# Patient Record
Sex: FEMALE | Race: WHITE | NOT HISPANIC OR LATINO | URBAN - NONMETROPOLITAN AREA
[De-identification: names, ages, dates, MRNs, and addresses within clinical notes are randomized per-mention and may not be internally consistent; named-entity substitution may affect disease eponyms.]

---

## 2017-12-06 PROBLEM — H52.4: Noted: 2017-12-06

## 2017-12-06 PROBLEM — H52.03: Noted: 2017-12-06

## 2017-12-06 PROBLEM — H52.223: Noted: 2017-12-06

## 2020-01-09 ENCOUNTER — IMPORTED ENCOUNTER (OUTPATIENT)
Dept: URBAN - NONMETROPOLITAN AREA CLINIC 1 | Facility: CLINIC | Age: 60
End: 2020-01-09

## 2020-01-09 PROCEDURE — S0621 ROUTINE OPHTHALMOLOGICAL EXA: HCPCS

## 2020-01-09 PROCEDURE — 92310 CONTACT LENS FITTING OU: CPT

## 2020-01-09 NOTE — PATIENT DISCUSSION
Hyperopia-Discussed diagnosis with patient. Astigmatism-Discussed diagnosis with patient. Presbyopia-Discussed diagnosis with patient. Updated spec Rx given. Recommend lens that will provide comfort as well as protect safety and health of eyes. cl's trials givenair optix mf od +2.50/med  os+4.00/medtheses lenses acceptableorder av 1 day nidia matos and pt to  and try9/1/2020 Pt likes B&L Purevision 2 cl's  R +2.50/Low L +3.50/High.

## 2020-03-13 ENCOUNTER — IMPORTED ENCOUNTER (OUTPATIENT)
Dept: URBAN - NONMETROPOLITAN AREA CLINIC 1 | Facility: CLINIC | Age: 60
End: 2020-03-13

## 2020-03-13 PROCEDURE — 99213 OFFICE O/P EST LOW 20 MIN: CPT

## 2020-03-13 NOTE — PATIENT DISCUSSION
KURT OUEDCUATE PTSTART TOBRADEX 1GTT QID OU X 1WKSWITCH TO CLEAR CAREHyperopia-Discussed diagnosis with patient. Astigmatism-Discussed diagnosis with patient. Presbyopia-Discussed diagnosis with patient. Updated spec Rx given. Recommend lens that will provide comfort as well as protect safety and health of eyes. cl's trials givenair optix mf od +2.50/med  os+4.00/medtheses lenses acceptableorder av 1 day nidia matos and pt to  and try

## 2020-05-18 PROBLEM — H52.4: Noted: 2020-05-18

## 2020-05-18 PROBLEM — H52.03: Noted: 2020-05-18

## 2020-05-18 PROBLEM — H52.223: Noted: 2020-05-18

## 2020-05-18 PROBLEM — H10.413: Noted: 2020-05-18

## 2021-07-02 ENCOUNTER — IMPORTED ENCOUNTER (OUTPATIENT)
Dept: URBAN - NONMETROPOLITAN AREA CLINIC 1 | Facility: CLINIC | Age: 61
End: 2021-07-02

## 2021-07-02 PROBLEM — H25.813: Noted: 2021-07-02

## 2021-07-02 PROBLEM — H52.4: Noted: 2021-07-02

## 2021-07-02 PROBLEM — H02.413: Noted: 2021-07-02

## 2021-07-02 PROBLEM — H52.223: Noted: 2021-07-02

## 2021-07-02 PROBLEM — H02.403: Noted: 2021-07-02

## 2021-07-02 PROBLEM — H40.033: Noted: 2021-07-02

## 2021-07-02 PROCEDURE — 92310 CONTACT LENS FITTING OU: CPT

## 2021-07-02 PROCEDURE — 76514 ECHO EXAM OF EYE THICKNESS: CPT

## 2021-07-02 PROCEDURE — 92133 CPTRZD OPH DX IMG PST SGM ON: CPT

## 2021-07-02 PROCEDURE — 92015 DETERMINE REFRACTIVE STATE: CPT

## 2021-07-02 PROCEDURE — 92012 INTRM OPH EXAM EST PATIENT: CPT

## 2021-07-02 NOTE — PATIENT DISCUSSION
Hyperopia-Discussed diagnosis with patient. Astigmatism-Discussed diagnosis with patient. Presbyopia-Discussed diagnosis with patient. Updated spec Rx given. Recommend lens that will provide comfort as well as protect safety and health of eyes. Will call patient when new OASYS MULTIFOCAL fititng set is inCataract OU-Not yet surgical. -Reviewed symptoms of advancing cataract growth such as glare and halos and decreased vision.-Continue to monitor for now. Pt will notify us if any new symptoms develop. Ptosis-Discussed Upneeq; information given will call if interested. ANAG-Updated Erik Edwards 73 OCT and PACH today; s/p PPI OU

## 2021-07-19 ENCOUNTER — IMPORTED ENCOUNTER (OUTPATIENT)
Dept: URBAN - NONMETROPOLITAN AREA CLINIC 1 | Facility: CLINIC | Age: 61
End: 2021-07-19

## 2021-07-19 NOTE — PATIENT DISCUSSION
Hyperopia-Discussed diagnosis with patient. Astigmatism-Discussed diagnosis with patient. Presbyopia-Discussed diagnosis with patient. Updated spec Rx given. Recommend lens that will provide comfort as well as protect safety and health of eyes. Will call patient when new OASYS MULTIFOCAL fititng set is inCataract OU-Not yet surgical. -Reviewed symptoms of advancing cataract growth such as glare and halos and decreased vision.-Continue to monitor for now. Pt will notify us if any new symptoms develop. Ptosis-Discussed Upneeq; information given will call if interested. ANAG-Updated Erik Edwards 73 OCT and PACH today; s/p PPI OU7/19/21- RX Check New Glasses rx dispensed today per SS.  Discussed CTL's rx with pt. and doing well with AV Oasys-will be contacted for CTL's fitting for Oasys MF.

## 2021-08-02 ENCOUNTER — IMPORTED ENCOUNTER (OUTPATIENT)
Dept: URBAN - NONMETROPOLITAN AREA CLINIC 1 | Facility: CLINIC | Age: 61
End: 2021-08-02

## 2021-08-02 NOTE — PATIENT DISCUSSION
Hyperopia-Discussed diagnosis with patient. Astigmatism-Discussed diagnosis with patient. 8/2/21:  Patient having trouble adjusting to new rx. Will reduce power and confirm seg height and re-order. Newest frame also is larger and not as comfortable. Presbyopia-Discussed diagnosis with patient. Updated spec Rx given. Recommend lens that will provide comfort as well as protect safety and health of eyes. Will call patient when new OASYS MULTIFOCAL fititng set is inCataract OU-Not yet surgical. -Reviewed symptoms of advancing cataract growth such as glare and halos and decreased vision.-Continue to monitor for now. Pt will notify us if any new symptoms develop. Ptosis-Discussed Upneeq; information given will call if interested. ANAG-Updated [facility] OCT and PACH today; s/p PPI OU

## 2021-09-13 NOTE — PROCEDURE NOTE: CLINICAL
PROCEDURE NOTE: Avastin () #1 OD. Diagnosis: Cystoid Macular Degeneration. Anesthesia: Lidocaine 4%. Prep: Betadine Drops. Prior to injection, risks/benefits/alternatives discussed including infection, loss of vision, hemorrhage, cataract, glaucoma, retinal tears or detachment. The off-label status of Intravitreal Avastin also was reviewed. The patient wished to proceed with treatment. Topical anesthesia was induced with Alcaine. Additional anesthesia was achieved using drop(s) or injection checked above. a drop of Povidone-iodine 5% ophthalmic solution was instilled over the injection site and in the inferior fornix. Betadine prep was performed. Using the syringe provided, Avastin 1.25 mg in 0.05 cc was injected into the vitreous cavity. The needle was passed 3.0 mm posterior to the limbus in pseudophakic patients, and 3.5 mm posterior to the lumbus in phakic patients. The remainder of the Avastin in the single-use vial was then discarded in a medical waste disposal container. Unused medication was discarded. Patient tolerated procedure well. There were no complications. Injection time: *. Post-op instructions given. Expiration Date: 11/3/2021. The patient was instructed to return for re-evaluation in approximately 4-12 weeks depending on his/her condition and was told to call immediately if vision decreases and/or if his/her eye becomes red, painful, and/or light sensitive. The patient was instructed to go to the emergency room or call 911 if unable to reach the doctor within an hour or two of trying or calling. The patient was instructed to use Artificial Tears q.i.d. p.r.n for comfort. Ariana Tierney

## 2021-10-20 NOTE — PROCEDURE NOTE: CLINICAL
PROCEDURE NOTE: Avastin () OD. Diagnosis: Cystoid Macular Degeneration. Anesthesia: Akten Gel 3.5%. Prep: Betadine Drops. Prior to injection, risks/benefits/alternatives discussed including infection, loss of vision, hemorrhage, cataract, glaucoma, retinal tears or detachment. The off-label status of Intravitreal Avastin also was reviewed. The patient wished to proceed with treatment. Topical anesthesia was induced with Alcaine. Additional anesthesia was achieved using drop(s) or injection checked above. a drop of Povidone-iodine 5% ophthalmic solution was instilled over the injection site and in the inferior fornix. Betadine prep was performed. Using the syringe provided, Avastin 1.25 mg in 0.05 cc was injected into the vitreous cavity. The needle was passed 3.0 mm posterior to the limbus in pseudophakic patients, and 3.5 mm posterior to the lumbus in phakic patients. The remainder of the Avastin in the single-use vial was then discarded in a medical waste disposal container. Unused medication was discarded. Patient tolerated procedure well. There were no complications. Injection time: 1:02PM. Post-op instructions given. Expiration Date: 12/20/2021. The patient was instructed to return for re-evaluation in approximately 4-12 weeks depending on his/her condition and was told to call immediately if vision decreases and/or if his/her eye becomes red, painful, and/or light sensitive. The patient was instructed to go to the emergency room or call 911 if unable to reach the doctor within an hour or two of trying or calling. The patient was instructed to use Artificial Tears q.i.d. p.r.n for comfort. [facility]

## 2021-10-26 ENCOUNTER — IMPORTED ENCOUNTER (OUTPATIENT)
Dept: URBAN - NONMETROPOLITAN AREA CLINIC 1 | Facility: CLINIC | Age: 61
End: 2021-10-26

## 2021-10-26 NOTE — PATIENT DISCUSSION
Hyperopia-Discussed diagnosis with patient. Astigmatism-Discussed diagnosis with patient. 8/2/21:  Patient having trouble adjusting to new rx. Will reduce power and confirm seg height and re-order. Newest frame also is larger and not as comfortable. Presbyopia-Discussed diagnosis with patient. Updated spec Rx given. Recommend lens that will provide comfort as well as protect safety and health of eyes. Will call patient when new OASYS MULTIFOCAL fititng set is inCataract OU-Not yet surgical. -Reviewed symptoms of advancing cataract growth such as glare and halos and decreased vision.-Continue to monitor for now. Pt will notify us if any new symptoms develop. Ptosis-Discussed Upneeq; information given will call if interested. ANAG-Updated Erik Edwards 73 OCT and PACH today; s/p PPI OU

## 2021-11-24 NOTE — PATIENT DISCUSSION
11/24/21: GIVEN THE LACK OF RESPONSE TO AVASTIN AND EVIDENCE OF CME ON FA, RECOMMEND INTRAVITREAL STEROID. Based on today’s exam, diagnostic studies, and review of records, the determination was made for TRIESENSE 4mg recommended TODAY after discussion of benefits, risks and alternatives. The injection was given without complication and tolerated well by the patient. Post-injection instructions were reviewed and understood by the patient. Procedure was performed without complications. Instructed to call immediately if any new distortion, blurring, decreased vision or eye pain.

## 2021-11-24 NOTE — PROCEDURE NOTE: CLINICAL
PROCEDURE NOTE: Intravitreal Triesence OD. Diagnosis: Cystoid Macular Degeneration. Anesthesia: Lidocaine 4%. Prep: Betadine Flush. Prior to injection, risks/benefits/alternatives discussed including infection, loss of vision, hemorrhage, cataract, glaucoma, retinal tears or detachment. The off-label status of Intravitreal Triesence also was reviewed. The patient wished to proceed with treatment. The patient was seated in the examination chair. Topical anesthesia was induced with Alcaine. Additional anesthesia was achieved using drop(s) or injection checked above. A drop of Povidone-iodine 5% ophthalmic solution was instilled over the injection site and in the inferior fornix. Betadine prep was performed. A 1 cc syringe with a #27 gauge 1/2” needle was used to inject a total of 0.1 cc of Triamcinolone Acetonide 40 mg/ml, which is 4 mg of Triamcinolone Acetonide, into the vitreous cavity. The remainder of the intravitreal Triesence in the single-use vial was then discarded in a medical waste disposal container. The needle was passed 3.0 mm posterior to the limbus in pseudophakic patients, and 3.5 mm posterior to the limbus in phakic patients. The injection was made inferotemporally. The majority of the Triamcinolone Acetonide settled inferiorly. The retina was examined following the injection. Injection Time: 311PM. IOP Time: *. The pressure 10 minutes after the injection was *. Patient tolerated procedure well. There were no complications. Post injection instructions given. The patient was instructed of a follow up appointment for 6 weeks and was told to call immediately if his/her vision decreased, and/or if his/her eye became red, painful, and/or light sensitive. If the patient is unable to reach the doctor within an hour or two, the patient was instructed to go to the emergency room or call 911. The patient was instructed to use Artificial Tears q.i.d. p.r.n for comfort. Pop Wong

## 2021-11-30 ENCOUNTER — IMPORTED ENCOUNTER (OUTPATIENT)
Dept: URBAN - NONMETROPOLITAN AREA CLINIC 1 | Facility: CLINIC | Age: 61
End: 2021-11-30

## 2021-11-30 NOTE — PATIENT DISCUSSION
Hyperopia-Discussed diagnosis with patient. Astigmatism-Discussed diagnosis with patient. 8/2/21:  Patient having trouble adjusting to new rx. Will reduce power and confirm seg height and re-order. Newest frame also is larger and not as comfortable. Presbyopia-Discussed diagnosis with patient. Updated spec Rx given. Recommend lens that will provide comfort as well as protect safety and health of eyes. Will call patient when new OASYS MULTIFOCAL fititng set is inCataract OU-Not yet surgical. -Reviewed symptoms of advancing cataract growth such as glare and halos and decreased vision.-Continue to monitor for now. Pt will notify us if any new symptoms develop. Ptosis-Discussed Upneeq; information given will call if interested. ANAG-Updated Erik Edwards 73 OCT and PACH today; s/p PPI OUCTL's fit 10/26/21Patient to trial new AV oasys MF CTL's. Instructed on care and order when ready. +2.75/HIGH OU 11/30/21 -CTL's refit. AV Oasys MF trials given. Doc in file all trials. Patient to call with updates as needed or place order for +2.75 MED(DV)  and +3.50 HIGH(NV) for Multifocal- monovision CTL's.

## 2021-12-29 NOTE — PATIENT DISCUSSION
12/29/21: IMPROVEMENT AFTER TRIESENCE BUT PERSISTENT DUE TO TAUT ERM. REVIEWED POSSIBILITIES OF TX INCLUDING MORE STEROIDS (TOO EARLY FOR ANOTHER INTRAVITREAL TRIESENCE), VS TOPICAL DROPS VS SURGERY (CONSIDERING SUBOPTIMAL RESPONSE TO INJ). PT WOULD LIKE TO CONT TOPICAL MEDS AND REASSSESS NEXT VISIT. Patient denies chest pain.  Troponin is not elevated  Continue outpatient medication-aspirin, lisinopril, metoprolol and ranolazine

## 2022-02-02 NOTE — PATIENT DISCUSSION
2/2/22: GIVEN THE WORSENING/PROGRESSION OF ERM AND SUBOPTIMAL RESPONSE TO ANTIVEGF AND STEROIDS IN OD, RECOMMEND SURGERY.

## 2022-02-02 NOTE — PATIENT DISCUSSION
2/2/22: CME WORSENING ADN PROGRESSION OF ERM IN OD. Based on today’s exam, diagnostic studies, and review of records, and the patients functional difficulty which appear to be a result of the macular pucker, the DETERMINATION WAS MADE FOR A VITRECTOMY with membrane peel. Discussed benefits, alternatives, and risks of surgery including (but not limited to) cataract (if natural lens is still present), infection, bleeding, retinal detachment, optic neuropathy, loss of vision, blindness, and loss of eye. Patient was told the vision may not return to the same level as prior to development of the membrane but should improve as the anatomy returns to a more normal contour. No prediction of the level of visual improvement and/or the degree of distortion reduction can be given.

## 2022-02-02 NOTE — PATIENT DISCUSSION
12/29/21: IMPROVEMENT AFTER TRIESENCE BUT PERSISTENT DUE TO TAUT ERM. REVIEWED POSSIBILITIES OF TX INCLUDING MORE STEROIDS (TOO EARLY FOR ANOTHER INTRAVITREAL TRIESENCE), VS TOPICAL DROPS VS SURGERY (CONSIDERING SUBOPTIMAL RESPONSE TO INJ). PT WOULD LIKE TO CONT TOPICAL MEDS AND REASSSESS NEXT VISIT.

## 2022-03-14 NOTE — PATIENT DISCUSSION
3/14/22: CONT W SX AS PLANNED. Based on today’s exam, diagnostic studies, and review of records, and the patients functional difficulty which appear to be a result of the macular pucker, the DETERMINATION WAS MADE FOR A VITRECTOMY with membrane peel. Discussed benefits, alternatives, and risks of surgery including (but not limited to) cataract (if natural lens is still present), infection, bleeding, retinal detachment, optic neuropathy, loss of vision, blindness, and loss of eye. Patient was told the vision may not return to the same level as prior to development of the membrane but should improve as the anatomy returns to a more normal contour. No prediction of the level of visual improvement and/or the degree of distortion reduction can be given.

## 2022-04-15 ASSESSMENT — PACHYMETRY
OD_CT_UM: 552; ADJ: THIN
OS_CT_UM: 592; ADJ: THICK
OS_CT_UM: 592; ADJ: THICK
OD_CT_UM: 552; ADJ: THIN
OS_CT_UM: 592; ADJ: THICK
OS_CT_UM: 592; ADJ: THICK
OD_CT_UM: 552; ADJ: THIN
OD_CT_UM: 552; ADJ: THIN

## 2022-04-15 ASSESSMENT — VISUAL ACUITY
OS_SC: 20/25
OS_SC: 20/25
OS_SC: 20/20
OD_CC: 20/80
OD_SC: 20/40-2
OD_SC: 20/25
OS_SC: 20/25
OD_SC: 20/40
OS_SC: 20/20
OS_CC: 20/30-1
OS_SC: 20/25
OD_SC: 20/20-1
OS_SC: 20/25
OD_SC: 20/20-1
OD_SC: 20/20-1
OS_SC: 20/40-1
OS_SC: 20/20
OU_SC: 20/30
OD_SC: 20/25
OD_PH: 20/25
OS_SC: 20/25
OD_SC: 20/20-1
OD_SC: 20/20
OU_SC: 20/30
OS_SC: 20/20-
OD_SC: 20/25

## 2022-04-15 ASSESSMENT — TONOMETRY
OS_IOP_MMHG: 15
OD_IOP_MMHG: 13
OS_IOP_MMHG: 12
OS_IOP_MMHG: 13
OD_IOP_MMHG: 15
OD_IOP_MMHG: 12

## 2022-07-07 ENCOUNTER — ESTABLISHED PATIENT (OUTPATIENT)
Dept: RURAL CLINIC 2 | Facility: CLINIC | Age: 62
End: 2022-07-07

## 2022-07-07 DIAGNOSIS — H52.03: ICD-10-CM

## 2022-07-07 DIAGNOSIS — H25.813: ICD-10-CM

## 2022-07-07 DIAGNOSIS — H52.4: ICD-10-CM

## 2022-07-07 DIAGNOSIS — H40.033: ICD-10-CM

## 2022-07-07 DIAGNOSIS — H02.413: ICD-10-CM

## 2022-07-07 PROCEDURE — 92133 CPTRZD OPH DX IMG PST SGM ON: CPT

## 2022-07-07 PROCEDURE — 99214 OFFICE O/P EST MOD 30 MIN: CPT

## 2022-07-07 PROCEDURE — 92015 DETERMINE REFRACTIVE STATE: CPT

## 2022-07-07 PROCEDURE — 92310 CONTACT LENS FITTING OU: CPT

## 2022-07-07 ASSESSMENT — TONOMETRY
OD_IOP_MMHG: 14
OS_IOP_MMHG: 14

## 2022-07-07 ASSESSMENT — VISUAL ACUITY
OU_CC: 20/25
OS_CC: 20/25
OD_CC: 20/30

## 2022-07-07 NOTE — PATIENT DISCUSSION
Also discussed Philly Meade in attempt to improve vision.  Brochure given.  Will consider if CL adjustment not helpful.
